# Patient Record
Sex: FEMALE | URBAN - METROPOLITAN AREA
[De-identification: names, ages, dates, MRNs, and addresses within clinical notes are randomized per-mention and may not be internally consistent; named-entity substitution may affect disease eponyms.]

---

## 2021-04-20 ENCOUNTER — APPOINTMENT (OUTPATIENT)
Dept: URBAN - METROPOLITAN AREA CLINIC 282 | Age: 31
Setting detail: DERMATOLOGY
End: 2021-04-20

## 2021-04-20 DIAGNOSIS — Z71.89 OTHER SPECIFIED COUNSELING: ICD-10-CM

## 2021-04-20 DIAGNOSIS — D22 MELANOCYTIC NEVI: ICD-10-CM

## 2021-04-20 PROBLEM — D22.5 MELANOCYTIC NEVI OF TRUNK: Status: ACTIVE | Noted: 2021-04-20

## 2021-04-20 PROCEDURE — OTHER SUNSCREEN RECOMMENDATIONS: OTHER

## 2021-04-20 PROCEDURE — 99202 OFFICE O/P NEW SF 15 MIN: CPT

## 2021-04-20 PROCEDURE — OTHER COUNSELING: OTHER

## 2021-04-20 PROCEDURE — OTHER MIPS QUALITY: OTHER

## 2021-04-20 ASSESSMENT — LOCATION DETAILED DESCRIPTION DERM
LOCATION DETAILED: INFERIOR THORACIC SPINE
LOCATION DETAILED: MIDDLE STERNUM

## 2021-04-20 ASSESSMENT — LOCATION SIMPLE DESCRIPTION DERM
LOCATION SIMPLE: UPPER BACK
LOCATION SIMPLE: CHEST

## 2021-04-20 ASSESSMENT — LOCATION ZONE DERM: LOCATION ZONE: TRUNK

## 2021-07-02 ENCOUNTER — OFFICE VISIT (OUTPATIENT)
Dept: URBAN - METROPOLITAN AREA CLINIC 32 | Facility: CLINIC | Age: 31
End: 2021-07-02
Payer: COMMERCIAL

## 2021-07-02 DIAGNOSIS — H54.50 LOW VISION, ONE EYE, UNSPECIFIED EYE: Primary | ICD-10-CM

## 2021-07-02 DIAGNOSIS — H35.103 BILATERAL RETINOPATHY OF PREMATURITY: ICD-10-CM

## 2021-07-02 PROCEDURE — 99204 OFFICE O/P NEW MOD 45 MIN: CPT | Performed by: OPTOMETRIST

## 2021-07-02 ASSESSMENT — INTRAOCULAR PRESSURE
OS: 15
OD: 16

## 2021-07-02 ASSESSMENT — VISUAL ACUITY
OD: CF 5FT
OS: 20/100

## 2021-07-02 NOTE — IMPRESSION/PLAN
Impression: Low vision, one eye, unspecified eye: H54.50. Plan: Recommend: R[employer]ick 4x Hand Magnifier, [employer] Smart [employer], SOMMER Lights(often available at [employer] or [facility]), Large TV 72'' or bigger, Audible Books Discussed: [employer], iPhone/iPad, Audible Assistants([employer] Home)

## 2021-07-02 NOTE — IMPRESSION/PLAN
Impression: Bilateral retinopathy of prematurity: H35.103. Plan: Discussed diagnosis with patient.  Patient to continue care with outside [address] [place] [address],[address] [facility] Specialist